# Patient Record
Sex: MALE | Race: OTHER | URBAN - METROPOLITAN AREA
[De-identification: names, ages, dates, MRNs, and addresses within clinical notes are randomized per-mention and may not be internally consistent; named-entity substitution may affect disease eponyms.]

---

## 2018-07-26 ENCOUNTER — OFFICE VISIT (OUTPATIENT)
Dept: URGENT CARE | Age: 23
End: 2018-07-26

## 2018-07-26 VITALS
HEIGHT: 68 IN | TEMPERATURE: 97.6 F | WEIGHT: 131 LBS | SYSTOLIC BLOOD PRESSURE: 93 MMHG | HEART RATE: 58 BPM | BODY MASS INDEX: 19.85 KG/M2 | OXYGEN SATURATION: 98 % | RESPIRATION RATE: 16 BRPM | DIASTOLIC BLOOD PRESSURE: 43 MMHG

## 2018-07-26 DIAGNOSIS — S61.412A LACERATION OF LEFT HAND WITHOUT FOREIGN BODY, INITIAL ENCOUNTER: Primary | ICD-10-CM

## 2018-07-26 DIAGNOSIS — T14.90XA INJURY: ICD-10-CM

## 2018-07-26 RX ORDER — CEPHALEXIN 500 MG/1
500 CAPSULE ORAL 2 TIMES DAILY
Qty: 20 CAP | Refills: 0 | Status: SHIPPED | OUTPATIENT
Start: 2018-07-26 | End: 2018-08-05

## 2018-07-26 NOTE — PROGRESS NOTES
HPI Comments: 23 y.o. male sustained laceration of left hand between 1st and 2nd digits 12 hours ago. Tharon March of injury: Was cutting with a knife and accidentally cut left hand. Tetanus vaccination status reviewed: unknown. The history is provided by the patient. History reviewed. No pertinent past medical history. History reviewed. No pertinent surgical history. History reviewed. No pertinent family history. Social History     Social History    Marital status: SINGLE     Spouse name: N/A    Number of children: N/A    Years of education: N/A     Occupational History    Not on file. Social History Main Topics    Smoking status: Never Smoker    Smokeless tobacco: Never Used    Alcohol use Not on file    Drug use: Not on file    Sexual activity: Not on file     Other Topics Concern    Not on file     Social History Narrative    No narrative on file                ALLERGIES: Review of patient's allergies indicates no known allergies. Review of Systems   Constitutional: Negative for chills and fever. Respiratory: Negative for shortness of breath and wheezing. Cardiovascular: Negative for chest pain and palpitations. Musculoskeletal: Negative for myalgias. Skin: Positive for wound. Negative for rash. Hematological: Negative for adenopathy. Vitals:    07/26/18 1148   BP: 93/43   Pulse: (!) 58   Resp: 16   Temp: 97.6 °F (36.4 °C)   SpO2: 98%   Weight: 131 lb (59.4 kg)   Height: 5' 8\" (1.727 m)       Physical Exam   Constitutional: He appears well-developed and well-nourished. No distress. Cardiovascular:   Pulses:       Radial pulses are 2+ on the left side. Neurological: He is alert. Skin: He is not diaphoretic. Left hand: FROM, <2 sec cap refill; between 1st & 2nd digits: 2.5cm linear laceration 4mm deep   Psychiatric: He has a normal mood and affect.  His behavior is normal. Judgment and thought content normal.   Nursing note and vitals reviewed. [facility]    Wound Repair  Date/Time: 7/26/2018 12:41 PM  Performed by: attendingPreparation: skin prepped with Bert-Osbaldo  Pre-procedure re-eval: Immediately prior to the procedure, the patient was reevaluated and found suitable for the planned procedure and any planned medications. Location details: left hand  Wound length:2.5 cm or less  Anesthesia: local infiltration    Anesthesia:  Local Anesthetic: lidocaine 1% without epinephrine  Anesthetic total: 4 mL  Foreign bodies: no foreign bodies  Irrigation solution: saline  Irrigation method: syringe  Skin closure: 5-0 nylon  Number of sutures: 7  Technique: simple and interrupted  Dressing: antibiotic ointment and pressure dressing  Patient tolerance: Patient tolerated the procedure well with no immediate complications  My total time at bedside, performing this procedure was 1-15 minutes. ICD-10-CM ICD-9-CM    1. Laceration of left hand without foreign body, initial encounter S61.412A 882.0    2. Injury T14.90XA 959.9 TETANUS, DIPHTHERIA TOXOIDS AND ACELLULAR PERTUSSIS VACCINE (TDAP), IN INDIVIDS. >=7, IM     Medications Ordered Today   Medications    cephALEXin (KEFLEX) 500 mg capsule     Sig: Take 1 Cap by mouth two (2) times a day for 10 days.      Dispense:  20 Cap     Refill:  0     RTC in 10 days for suture removal

## 2018-07-26 NOTE — MR AVS SNAPSHOT
[facility] [address] [place] [id number] 
[phone] Patient: Emilia Mascorro MRN: [id number] :1995 Visit Information Date & Time Provider Department Dept. Phone Encounter #  
 2018 11:45 AM [address] [facility] [phone] [id number] Upcoming Health Maintenance Date Due Hepatitis A Peds Age 1-18 (1 of 2 - Standard Series) 1998 DTaP/Tdap/Td series (1 - Tdap) 2004 HPV Age 9Y-34Y (1 of 1 - Male 3 Dose Series) 2008 Influenza Age 5 to Adult 2018 Allergies as of 2018  Review Complete On: 2018 By: Ketan Hamlin MD  
 No Known Allergies Current Immunizations  Never Reviewed Name Date Tdap 2018 Not reviewed this visit You Were Diagnosed With   
  
 Codes Comments Laceration of left hand without foreign body, initial encounter    -  Primary ICD-10-CM: R25.904C ICD-9-CM: 882.0 Injury     ICD-10-CM: T14.90XA ICD-9-CM: 481. 9 Vitals BP Pulse Temp Resp Height(growth percentile) Weight(growth percentile) 93/43 (!) 58 97.6 °F (36.4 °C) 16 5' 8\" (1.727 m) 131 lb (59.4 kg) SpO2 BMI Smoking Status 98% 19.92 kg/m2 Never Smoker BMI and BSA Data Body Mass Index Body Surface Area  
 19.92 kg/m 2 1.69 m 2 Your Updated Medication List  
  
   
This list is accurate as of 18 12:23 PM.  Always use your most recent med list.  
  
  
  
  
 cephALEXin 500 mg capsule Commonly known as:  Gearold Spry Take 1 Cap by mouth two (2) times a day for 10 days. Prescriptions Printed Refills  
 cephALEXin (KEFLEX) 500 mg capsule 0 Sig: Take 1 Cap by mouth two (2) times a day for 10 days. Class: Print Route: Oral  
  
We Performed the Following TETANUS, DIPHTHERIA TOXOIDS AND ACELLULAR PERTUSSIS VACCINE (TDAP), IN INDIVIDS. >=7, IM [id number] CPT(R)] Patient Instructions Return to clinic in 10 days for suture removal 
  
Cuts Closed With Stitches: Care Instructions Your Care Instructions A cut can happen anywhere on your body. The doctor used stitches to close the cut. Using stitches also helps the cut heal and reduces scarring. Sometimes pieces of tape called Steri-Strips are put over the stitches. If the cut went deep and through the skin, the doctor may have put in two layers of stitches. The deeper layer brings the deep part of the cut together. These stitches will dissolve and don't need to be removed. The stitches in the upper layer are the ones you see on the cut. You will probably have a bandage over the stitches. You will need to have the stitches removed, usually in 7 to 14 days. The doctor has checked you carefully, but problems can develop later. If you notice any problems or new symptoms, get medical treatment right away. Follow-up care is a key part of your treatment and safety. Be sure to make and go to all appointments, and call your doctor if you are having problems. It's also a good idea to know your test results and keep a list of the medicines you take. How can you care for yourself at home? · Keep the cut dry for the first 24 to 48 hours. After this, you can shower if your doctor okays it. Pat the cut dry. · Don't soak the cut, such as in a bathtub. Your doctor will tell you when it's safe to get the cut wet. · If your doctor told you how to care for your cut, follow your doctor's instructions. If you did not get instructions, follow this general advice: ¨ After the first 24 to 48 hours, wash around the cut with clean water 2 times a day. Don't use hydrogen peroxide or alcohol, which can slow healing. ¨ You may cover the cut with a thin layer of petroleum jelly, such as Vaseline, and a nonstick bandage. ¨ Apply more petroleum jelly and replace the bandage as needed.  
· Prop up the sore area on a pillow anytime you sit or lie down during the next 3 days. Try to keep it above the level of your heart. This will help reduce swelling. · Avoid any activity that could cause your cut to reopen. · Do not remove the stitches on your own. Your doctor will tell you when to come back to have the stitches removed. · Leave Steri-Strips on until they fall off. · Be safe with medicines. Read and follow all instructions on the label. ¨ If the doctor gave you a prescription medicine for pain, take it as prescribed. ¨ If you are not taking a prescription pain medicine, ask your doctor if you can take an over-the-counter medicine. When should you call for help? Call your doctor now or seek immediate medical care if: 
  · You have new pain, or your pain gets worse.  
  · The skin near the cut is cold or pale or changes color.  
  · You have tingling, weakness, or numbness near the cut.  
  · The cut starts to bleed, and blood soaks through the bandage. Oozing small amounts of blood is normal.  
  · You have trouble moving the area near the cut.  
  · You have symptoms of infection, such as: 
¨ Increased pain, swelling, warmth, or redness around the cut. ¨ Red streaks leading from the cut. ¨ Pus draining from the cut. ¨ A fever.  
 Watch closely for changes in your health, and be sure to contact your doctor if: 
  · The cut reopens.  
  · You do not get better as expected. Where can you learn more? Go to http://iwona-adan.info/. Enter R217 in the search box to learn more about \"Cuts Closed With Stitches: Care Instructions. \" Current as of: November 20, 2017 Content Version: 11.7 © 4371-8418 [employer]. Care instructions adapted under license by [employer] (which disclaims liability or warranty for this information).  If you have questions about a medical condition or this instruction, always ask your healthcare professional. Jimmy Mckeon any warranty or liability for your use of this information. Introducing [facility]! Virgie Flores introduces [employer] patient portal. Now you can access parts of your medical record, email your doctor's office, and request medication refills online. 1. In your internet browser, go to [email or web address] 2. Click on the First Time User? Click Here link in the Sign In box. You will see the New Member Sign Up page. 3. Enter your [employer]otor Access Code exactly as it appears below. You will not need to use this code after youve completed the sign-up process. If you do not sign up before the expiration date, you must request a new code. · [employer] Access Code: [email or web address] Expires: 10/24/2018 12:23 PM 
 
4. Enter the last four digits of your Social Security Number (xxxx) and Date of Birth (mm/dd/yyyy) as indicated and click Submit. You will be taken to the next sign-up page. 5. Create a Celotor ID. This will be your [employer]otor login ID and cannot be changed, so think of one that is secure and easy to remember. 6. Create a [employer] password. You can change your password at any time. 7. Enter your Password Reset Question and Answer. This can be used at a later time if you forget your password. 8. Enter your e-mail address. You will receive e-mail notification when new information is available in [address] 9. Click Sign Up. You can now view and download portions of your medical record. 10. Click the Download Summary menu link to download a portable copy of your medical information. If you have questions, please visit the Frequently Asked Questions section of the [employer] website. Remember, [employer] is NOT to be used for urgent needs. For medical emergencies, dial 911. Now available from your iPhone and Android! Please provide this summary of care documentation to your next provider. If you have any questions after today's visit, please call [phone].

## 2018-07-26 NOTE — PATIENT INSTRUCTIONS
Return to clinic in 10 days for suture removal 
  
Cuts Closed With Stitches: Care Instructions Your Care Instructions A cut can happen anywhere on your body. The doctor used stitches to close the cut. Using stitches also helps the cut heal and reduces scarring. Sometimes pieces of tape called Steri-Strips are put over the stitches. If the cut went deep and through the skin, the doctor may have put in two layers of stitches. The deeper layer brings the deep part of the cut together. These stitches will dissolve and don't need to be removed. The stitches in the upper layer are the ones you see on the cut. You will probably have a bandage over the stitches. You will need to have the stitches removed, usually in 7 to 14 days. The doctor has checked you carefully, but problems can develop later. If you notice any problems or new symptoms, get medical treatment right away. Follow-up care is a key part of your treatment and safety. Be sure to make and go to all appointments, and call your doctor if you are having problems. It's also a good idea to know your test results and keep a list of the medicines you take. How can you care for yourself at home? · Keep the cut dry for the first 24 to 48 hours. After this, you can shower if your doctor okays it. Pat the cut dry. · Don't soak the cut, such as in a bathtub. Your doctor will tell you when it's safe to get the cut wet. · If your doctor told you how to care for your cut, follow your doctor's instructions. If you did not get instructions, follow this general advice: ¨ After the first 24 to 48 hours, wash around the cut with clean water 2 times a day. Don't use hydrogen peroxide or alcohol, which can slow healing. ¨ You may cover the cut with a thin layer of petroleum jelly, such as Vaseline, and a nonstick bandage. ¨ Apply more petroleum jelly and replace the bandage as needed.  
· Prop up the sore area on a pillow anytime you sit or lie down during the next 3 days. Try to keep it above the level of your heart. This will help reduce swelling. · Avoid any activity that could cause your cut to reopen. · Do not remove the stitches on your own. Your doctor will tell you when to come back to have the stitches removed. · Leave Steri-Strips on until they fall off. · Be safe with medicines. Read and follow all instructions on the label. ¨ If the doctor gave you a prescription medicine for pain, take it as prescribed. ¨ If you are not taking a prescription pain medicine, ask your doctor if you can take an over-the-counter medicine. When should you call for help? Call your doctor now or seek immediate medical care if: 
  · You have new pain, or your pain gets worse.  
  · The skin near the cut is cold or pale or changes color.  
  · You have tingling, weakness, or numbness near the cut.  
  · The cut starts to bleed, and blood soaks through the bandage. Oozing small amounts of blood is normal.  
  · You have trouble moving the area near the cut.  
  · You have symptoms of infection, such as: 
¨ Increased pain, swelling, warmth, or redness around the cut. ¨ Red streaks leading from the cut. ¨ Pus draining from the cut. ¨ A fever.  
 Watch closely for changes in your health, and be sure to contact your doctor if: 
  · The cut reopens.  
  · You do not get better as expected. Where can you learn more? Go to http://iwona-adan.info/. Enter R217 in the search box to learn more about \"Cuts Closed With Stitches: Care Instructions. \" Current as of: November 20, 2017 Content Version: 11.7 © 4398-8291 [employer]. Care instructions adapted under license by [employer] (which disclaims liability or warranty for this information).  If you have questions about a medical condition or this instruction, always ask your healthcare professional. [address] any warranty or liability for your use of this information.